# Patient Record
Sex: MALE | Race: WHITE | Employment: FULL TIME | URBAN - METROPOLITAN AREA
[De-identification: names, ages, dates, MRNs, and addresses within clinical notes are randomized per-mention and may not be internally consistent; named-entity substitution may affect disease eponyms.]

---

## 2018-04-04 ENCOUNTER — OFFICE VISIT (OUTPATIENT)
Dept: URGENT CARE | Facility: URGENT CARE | Age: 48
End: 2018-04-04
Payer: COMMERCIAL

## 2018-04-04 VITALS
HEART RATE: 69 BPM | TEMPERATURE: 97.3 F | OXYGEN SATURATION: 98 % | DIASTOLIC BLOOD PRESSURE: 73 MMHG | BODY MASS INDEX: 28.21 KG/M2 | SYSTOLIC BLOOD PRESSURE: 134 MMHG | WEIGHT: 196.6 LBS

## 2018-04-04 DIAGNOSIS — R21 RASH OF GROIN: Primary | ICD-10-CM

## 2018-04-04 LAB
KOH PREP SPEC: NORMAL
SPECIMEN SOURCE: NORMAL

## 2018-04-04 PROCEDURE — 87220 TISSUE EXAM FOR FUNGI: CPT | Performed by: PHYSICIAN ASSISTANT

## 2018-04-04 PROCEDURE — 99213 OFFICE O/P EST LOW 20 MIN: CPT | Performed by: PHYSICIAN ASSISTANT

## 2018-04-04 RX ORDER — CEPHALEXIN 500 MG/1
500 CAPSULE ORAL 3 TIMES DAILY
Qty: 21 CAPSULE | Refills: 0 | Status: SHIPPED | OUTPATIENT
Start: 2018-04-04 | End: 2018-04-11

## 2018-04-04 RX ORDER — CLOTRIMAZOLE 1 %
CREAM (GRAM) TOPICAL 2 TIMES DAILY
Qty: 15 G | Refills: 1 | Status: SHIPPED | OUTPATIENT
Start: 2018-04-04

## 2018-04-04 RX ORDER — FLUCONAZOLE 150 MG/1
150 TABLET ORAL ONCE
Qty: 2 TABLET | Refills: 0 | Status: SHIPPED | OUTPATIENT
Start: 2018-04-04 | End: 2018-04-04

## 2018-04-04 ASSESSMENT — ENCOUNTER SYMPTOMS
FEVER: 0
WEIGHT LOSS: 0
CARDIOVASCULAR NEGATIVE: 1
RESPIRATORY NEGATIVE: 1
EYE PAIN: 0
DIAPHORESIS: 0
CONSTITUTIONAL NEGATIVE: 1
PALPITATIONS: 0
COUGH: 0
HEMOPTYSIS: 0

## 2018-04-04 NOTE — MR AVS SNAPSHOT
"              After Visit Summary   2018    Ap Martini    MRN: [id number]           Patient Information     Date Of Birth          1970        Visit Information        Provider Department      2018 7:45 PM Renetta Dao PA-C [facility]        Today's Diagnoses     Rash of groin    -  1       Follow-ups after your visit        Who to contact     If you have questions or need follow up information about today's clinic visit or your schedule please contact [facility] directly at [phone].  Normal or non-critical lab and imaging results will be communicated to you by MyChart, letter or phone within 4 business days after the clinic has received the results. If you do not hear from us within 7 days, please contact the clinic through [employer] or phone. If you have a critical or abnormal lab result, we will notify you by phone as soon as possible.  Submit refill requests through [employer] or call your pharmacy and they will forward the refill request to us. Please allow 3 business days for your refill to be completed.          Additional Information About Your Visit        MyChart Information     [employer] lets you send messages to your doctor, view your test results, renew your prescriptions, schedule appointments and more. To sign up, go to [email or web address] . Click on \"Log in\" on the left side of the screen, which will take you to the Welcome page. Then click on \"Sign up Now\" on the right side of the page.     You will be asked to enter the access code listed below, as well as some personal information. Please follow the directions to create your username and password.     Your access code is: [email or web address]  Expires: 7/3/2018  8:33 PM     Your access code will  in 90 days. If you need help or a new code, please call your [facility] or [phone].        Care EveryWhere ID     This is your Care EveryWhere ID. This could be used by other organizations to access " your [facility] medical records  [id number]        Your Vitals Were     Pulse Temperature Pulse Oximetry BMI (Body Mass Index)          69 97.3  F (36.3  C) (Tympanic) 98% 28.21 kg/m2         Blood Pressure from Last 3 Encounters:   04/04/18 134/73   10/03/16 121/72   08/19/15 119/73    Weight from Last 3 Encounters:   04/04/18 196 lb 9.6 oz (89.2 kg)   10/03/16 188 lb (85.3 kg)   08/19/15 188 lb (85.3 kg)              We Performed the Following     KOH prep (skin, hair or nails only)          Today's Medication Changes          These changes are accurate as of 4/4/18  8:35 PM.  If you have any questions, ask your nurse or doctor.               Start taking these medicines.        Dose/Directions    cephALEXin 500 MG capsule   Commonly known as:  KEFLEX   Used for:  Rash of groin   Started by:  Renetta Dao PA-C        Dose:  500 mg   Take 1 capsule (500 mg) by mouth 3 times daily for 7 days   Quantity:  21 capsule   Refills:  0       clotrimazole 1 % cream   Commonly known as:  LOTRIMIN   Used for:  Rash of groin   Started by:  Renetta Dao PA-C        Apply topically 2 times daily   Quantity:  15 g   Refills:  1       fluconazole 150 MG tablet   Commonly known as:  DIFLUCAN   Used for:  Rash of groin   Started by:  Renetta Dao PA-C        Dose:  150 mg   Take 1 tablet (150 mg) by mouth once for 1 dose May repeat after 3days if symptoms persist   Quantity:  2 tablet   Refills:  0            Where to get your medicines      These medications were sent to [id number] - [place] - [address] AT [facility] [address] [place] [address]     Phone:  [phone]     cephALEXin 500 MG capsule    clotrimazole 1 % cream    fluconazole 150 MG tablet                Primary Care Provider Office Phone # Fax #    [facility] [phone] [address] [facility] [address]        Equal Access to Services     JERMAIN WU AH: Beti  hardeep Ballard, wavinayda lupravinadaha, qaybta kaalmada dheeraj, waxrae zhane juanaana kaykonstantin jaderiannaewelina hillmanChipilia becki. So [facility] [phone].    ATENCIÓN: Si habla español, tiene a horne disposición servicios gratuitos de asistencia lingüística. Jesicaame al [phone].    We comply with applicable federal civil rights laws and [place] laws. We do not discriminate on the basis of race, color, national origin, age, disability, sex, sexual orientation, or gender identity.            Thank you!     Thank you for choosing [facility]  [facility]. Our goal is always to provide you with excellent care. Hearing back from our patients is one way we can continue to improve our services. Please take a few minutes to complete the written survey that you may receive in the mail after your visit with us. Thank you!             Your Updated Medication List - Protect others around you: Learn how to safely use, store and throw away your medicines at www.disposemymeds.org.          This list is accurate as of 4/4/18  8:35 PM.  Always use your most recent med list.                   Brand Name Dispense Instructions for use Diagnosis    cephALEXin 500 MG capsule    KEFLEX    21 capsule    Take 1 capsule (500 mg) by mouth 3 times daily for 7 days    Rash of groin       clotrimazole 1 % cream    LOTRIMIN    15 g    Apply topically 2 times daily    Rash of groin       fluconazole 150 MG tablet    DIFLUCAN    2 tablet    Take 1 tablet (150 mg) by mouth once for 1 dose May repeat after 3days if symptoms persist    Rash of groin

## 2018-04-05 NOTE — PROGRESS NOTES
SUBJECTIVE:                                                      HPI  Ap Martini is a 47 year old male who presents to clinic today for the following health issues:  Rash    Duration: 1-2 days      Description  Location: groin   Itching: moderate    Intensity:  moderate    Accompanying signs and symptoms: also reports redness and swelling but no drainage or fevers.   He does sweat at times down there but does not shave. Rash feels similar to his previous yeast infection in the past.  No new soaps/lotions/detergent, no new medications or foods.  No one else in the family with similar rashes.  No URI symptoms or fevers.      History (similar episodes/previous evaluation): hx of yeast infection     Precipitating or alleviating factors:  New exposures:  None  Recent travel: no      Therapies tried and outcome: OTC tinactin cream without any relief.      Reviewed PMH.  Patient Active Problem List   Diagnosis   (none) - all problems resolved or deleted     No current outpatient prescriptions on file.     No Known Allergies    Review of Systems   Constitutional: Negative.  Negative for diaphoresis, fever and weight loss.   HENT: Negative.    Eyes: Negative for pain.   Respiratory: Negative.  Negative for cough and hemoptysis.    Cardiovascular: Negative.  Negative for chest pain and palpitations.   Skin: Positive for itching and rash.   All other systems reviewed and are negative.      /73  Pulse 69  Temp 97.3  F (36.3  C) (Tympanic)  Wt 196 lb 9.6 oz (89.2 kg)  SpO2 98%  BMI 28.21 kg/m2  Physical Exam   Constitutional: He is oriented to person, place, and time and well-developed, well-nourished, and in no distress. No distress.   Neurological: He is alert and oriented to person, place, and time.   Skin: Skin is warm, dry and intact. Rash noted. No purpura noted. Rash is macular. Rash is not papular, not nodular, not pustular, not vesicular and not urticarial.        Psychiatric: Mood and affect normal.    Nursing note and vitals reviewed.        Assessment/Plan:  Rash of groin:  KOH was negative for yeast.  ?candida vs tinea vs cellulitis.  Will start diflucan as directed and keflex X7days.  Will also give clotrimazole cream.  Keep and clean dry.  Avoid triggers and irritating agents.  Avoid scratching to present secondary infection.  RTC if worsening rash or if he develops drainage or fevers.   -     KOH prep (skin, hair or nails only)  -     fluconazole (DIFLUCAN) 150 MG tablet; Take 1 tablet (150 mg) by mouth once for 1 dose May repeat after 3days if symptoms persist  -     cephALEXin (KEFLEX) 500 MG capsule; Take 1 capsule (500 mg) by mouth 3 times daily for 7 days  -     clotrimazole (LOTRIMIN) 1 % cream; Apply topically 2 times daily      Renetta Dao PA-C

## 2020-02-02 ENCOUNTER — OFFICE VISIT (OUTPATIENT)
Dept: URGENT CARE | Facility: URGENT CARE | Age: 50
End: 2020-02-02
Payer: COMMERCIAL

## 2020-02-02 VITALS
TEMPERATURE: 97.1 F | SYSTOLIC BLOOD PRESSURE: 129 MMHG | WEIGHT: 200 LBS | HEART RATE: 58 BPM | BODY MASS INDEX: 28.63 KG/M2 | DIASTOLIC BLOOD PRESSURE: 77 MMHG | HEIGHT: 70 IN

## 2020-02-02 DIAGNOSIS — S61.215A LACERATION OF LEFT RING FINGER WITHOUT FOREIGN BODY WITHOUT DAMAGE TO NAIL, INITIAL ENCOUNTER: Primary | ICD-10-CM

## 2020-02-02 PROCEDURE — 99213 OFFICE O/P EST LOW 20 MIN: CPT | Performed by: NURSE PRACTITIONER

## 2020-02-02 ASSESSMENT — ENCOUNTER SYMPTOMS
MUSCULOSKELETAL NEGATIVE: 1
NEUROLOGICAL NEGATIVE: 1
CONSTITUTIONAL NEGATIVE: 1
ROS SKIN COMMENTS: FINGER LACERATION
CARDIOVASCULAR NEGATIVE: 1
RESPIRATORY NEGATIVE: 1
SENSORY CHANGE: 0

## 2020-02-02 ASSESSMENT — MIFFLIN-ST. JEOR: SCORE: 1768.44

## 2020-02-02 NOTE — PATIENT INSTRUCTIONS
Patient Education     Hand Laceration: All Closures  A laceration is a cut through the skin. Deep cuts usually require stitches. Minor cuts may be closed with surgical tape or skin adhesive.   X-rays may be done if something may have entered the skin through the cut, such as broken glass. You may also be given a tetanus shot if you are not up to date on this vaccination and the object that cut you may carry tetanus.    Home care    Your healthcare provider may prescribe an antibiotic. This is to help prevent infection. Follow all instructions for taking this medicine. Take the medicine every day until it is gone or you are told to stop. You should not have any left over.    The healthcare provider may prescribe medicines for pain. Follow instructions for taking them.    Follow the healthcare provider s instructions on how to care for the cut.    Keep the wound clean and dry. Don't get the wound wet until you are told it is OK to do so. If the bandage gets wet, remove it. Gently pat the wound dry with a clean cloth. Then put on a clean, dry bandage.    To help prevent infection, wash your hands with soap and water before and after caring for the wound.     Caring for stiches: Once you no longer need to keep the stitches dry, clean the wound daily. First, remove the bandage. Then wash the area gently with soap and warm water, or as directed by the healthcare provider. Use a wet cotton swab to loosen and remove any blood or crust that forms. After cleaning, apply a thin layer of antibiotic ointment if advised. Then put on a new bandage unless you are told not to.    Caring for skin glue: Don t put apply liquid, ointment, or cream on the wound while the glue is in place. Avoid activities that cause heavy sweating. Protect the wound from sunlight. Don't scratch, rub, or pick at the adhesive film. Don't place tape directly over the film. The glue should peel off within 5 to 10 days.     Caring for surgical tape: Keep  the area dry. If it gets wet, blot it dry with a clean towel. Surgical tape usually falls off within 7 to 10 days. If it has not fallen off after 10 days, you can take it off yourself. Put mineral oil or petroleum jelly on a cotton ball and gently rub the tape until it is removed.    Once you can get the wound wet, you may shower as usual, but don't soak the wound in water. This means no tub baths or swimming.    Even with proper treatment, a wound infection may sometimes occur. Check the wound daily for signs of infection listed below.  Follow-up care  Follow up with your healthcare provider, or as advised. If you have stitches, be sure to return as directed to have them removed.  When to seek medical advice  Call your healthcare provider right away if any of these occur:    Wound bleeding not controlled by direct pressure    Signs of infection, including increasing pain in the wound, increasing wound redness or swelling, or pus or bad odor coming from the wound    Fever of 100.4 F (38. C) o higher, or as directed by your healthcare provider    Stitches come apart or fall out or surgical tape falls off before 7 days    Wound edges reopen    Wound changes colors    Numbness or weakness in the affected hand     Decreased movement of the hand  Date Last Reviewed: 7/1/2017 2000-2019 [employer]. [address] [place] [address], [place] [address]. All rights reserved. This information is not intended as a substitute for professional medical care. Always follow your healthcare professional's instructions.

## 2020-02-03 NOTE — PROGRESS NOTES
"HPI  Patient is a 49-year-old male who presents with a laceration on the left fourth finger.  He reports he was cut while working with an ice auger.  It is a superficial laceration running lengthwise down the palmar surface of that finger.  He has been holding pressure and hemostasis has been obtained.  He is up-to-date on his tetanus vaccine    Review of Systems   Constitutional: Negative.    Respiratory: Negative.    Cardiovascular: Negative.    Musculoskeletal: Negative.    Skin:        Finger laceration    Neurological: Negative.  Negative for sensory change.     History reviewed. No pertinent past medical history.  Patient Active Problem List   Diagnosis   (none) - all problems resolved or deleted      History reviewed. No pertinent surgical history.  No Known Allergies  Current Outpatient Medications   Medication     clotrimazole (LOTRIMIN) 1 % cream     No current facility-administered medications for this visit.          Physical Exam  Vitals signs and nursing note reviewed.   Constitutional:       General: He is not in acute distress.     Appearance: Normal appearance.      Comments: /77   Pulse 58   Temp 97.1  F (36.2  C) (Oral)   Ht 1.778 m (5' 10\")   Wt 90.7 kg (200 lb)   BMI 28.70 kg/m       HENT:      Head: Normocephalic.   Cardiovascular:      Rate and Rhythm: Normal rate.   Pulmonary:      Effort: Pulmonary effort is normal.   Musculoskeletal:      Left hand: He exhibits tenderness and laceration. He exhibits normal range of motion. Normal sensation noted. Normal strength noted.        Hands:    Skin:     Findings: Laceration present.   Neurological:      Mental Status: He is alert.       Assessment:  1. Laceration of left ring finger without foreign body without damage to nail, initial encounter        Plan:  Tylenol or Ibuprofen as directed on package for pain   Instructions regarding self-care of patient/child reviewed.   Written instructions provided in after visit summary and " reviewed.  Patient instructed to see primary care provider for new or persistent symptoms.   Red flag symptoms reviewed and patient has been instructed to seek emergent care      Jessica eSlf, DNP, APRN, CNP

## 2020-11-07 ENCOUNTER — HEALTH MAINTENANCE LETTER (OUTPATIENT)
Age: 50
End: 2020-11-07

## 2021-09-05 ENCOUNTER — HEALTH MAINTENANCE LETTER (OUTPATIENT)
Age: 51
End: 2021-09-05

## 2021-12-26 ENCOUNTER — HEALTH MAINTENANCE LETTER (OUTPATIENT)
Age: 51
End: 2021-12-26

## 2022-10-23 ENCOUNTER — HEALTH MAINTENANCE LETTER (OUTPATIENT)
Age: 52
End: 2022-10-23

## 2023-04-02 ENCOUNTER — HEALTH MAINTENANCE LETTER (OUTPATIENT)
Age: 53
End: 2023-04-02